# Patient Record
Sex: FEMALE | Race: WHITE | NOT HISPANIC OR LATINO | Employment: FULL TIME | URBAN - METROPOLITAN AREA
[De-identification: names, ages, dates, MRNs, and addresses within clinical notes are randomized per-mention and may not be internally consistent; named-entity substitution may affect disease eponyms.]

---

## 2023-12-27 ENCOUNTER — APPOINTMENT (OUTPATIENT)
Dept: HEMATOLOGY/ONCOLOGY | Facility: HOSPITAL | Age: 38
End: 2023-12-27

## 2024-01-22 ENCOUNTER — PATIENT OUTREACH (OUTPATIENT)
Dept: HEMATOLOGY/ONCOLOGY | Facility: HOSPITAL | Age: 39
End: 2024-01-22
Payer: COMMERCIAL

## 2024-01-22 NOTE — PROGRESS NOTES
RN talked to patient. Patient had DVT in 2010 following a surgery and this was in [place]. Patient states that she is only on anticoagulation with surgery or pregnancy. Patient said that labs were done in August 2023 at [facility]. Patient said that she had ankle surgery in August by Dr. Goodwin who referred her to Dr. Gayle. Patient instructed on date, time and place. Understanding voiced.

## 2024-01-24 ENCOUNTER — OFFICE VISIT (OUTPATIENT)
Dept: HEMATOLOGY/ONCOLOGY | Facility: HOSPITAL | Age: 39
End: 2024-01-24
Payer: COMMERCIAL

## 2024-01-24 VITALS
HEART RATE: 80 BPM | SYSTOLIC BLOOD PRESSURE: 139 MMHG | HEIGHT: 64 IN | RESPIRATION RATE: 18 BRPM | BODY MASS INDEX: 30.45 KG/M2 | OXYGEN SATURATION: 98 % | WEIGHT: 178.35 LBS | TEMPERATURE: 97.7 F | DIASTOLIC BLOOD PRESSURE: 79 MMHG

## 2024-01-24 DIAGNOSIS — Z86.718 HX OF DEEP VENOUS THROMBOSIS: Primary | ICD-10-CM

## 2024-01-24 PROCEDURE — 1036F TOBACCO NON-USER: CPT | Performed by: INTERNAL MEDICINE

## 2024-01-24 PROCEDURE — 99214 OFFICE O/P EST MOD 30 MIN: CPT | Performed by: INTERNAL MEDICINE

## 2024-01-24 ASSESSMENT — PAIN SCALES - GENERAL: PAINLEVEL: 0-NO PAIN

## 2024-01-24 NOTE — PROGRESS NOTES
Patient ID: Valentine Clark is a 38 y.o. female.  Referring Physician: No referring provider defined for this encounter.  Primary Care Provider: No primary care provider on file.  Visit Type:  Initial Visit     Subjective    HPI    Ms Clark is a 37 yo F that comes today for her first visit with us. She was referred by Dr Goodwin given her hx of DVT for perioperative anticoagulation recommendations.     She comes accompanied by her mother, Rafa. She had a LLE DVT in March 2010. This was diagnosed after L swelling 10 days after a L ankle surgery for torn ligament tear. Pt reports there were 2 clots on her calf. At that time she also was on OCP. She was treated with 9 weeks of Lovenox. She has been off OCP since.     In January 2011 she became pregnant and was put on Lovenox 1x/day then later in pregnancy Hep subcutaneous. She delivered baby in June 2011 with no complications and completed 6w post partum prophylactic AC.     Later in 2015 she had a second L ankle surgery for plantar fasciitis and tear. She was post-op managed with Xarelto for 6 weeks with no recurrent clot.   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 She underwent surgery in August with Dr Goodwin. Post-op she received 4mo of prophylactic anticoagulation with Eliqui, which finished in December. She is recovering well. She is back on full time work. She walks a lot at work and is very active with no mobility issues at this time.     She has tolerated AC well in all situations with no bleeding complications.   In regards of hypercoagulable wup, at time of index event she had lab work done in [place], which we are unable to review, but mentions it was unremarkable except POP-1 polymorphism. Most recently pre-op in Aug she had lab work repeat at [facility], with normal Ptn C, PtnS, PT gene, anticardiolipin and lupus anticoagulant, normal activated Ptn C  "resistance ratio (APC-R).  She is not planned for any surgical procedures and is not planning on pregnancy.     PMH: as above,     PSurgHx: as above, vaginal delivery    FamHx: father with Parkinson's disease (dx at 50yo) had a provoked fatal PE after brain surgery (age 60 yo), paternal grandparents with DVTs in their 80s, mother had early stage uterine cancer    Social: Works as  at [employer]. Never smoker, social ETOH, no drug use. Lives in [place], has one child.     Meds: none    Review of Systems - Oncology     12-ROS negative    Objective   BSA: 1.91 meters squared  /79 (BP Location: Left arm, Patient Position: Sitting, BP Cuff Size: Adult)   Pulse 80   Temp 36.5 °C (97.7 °F) (Temporal)   Resp 18   Ht (S) 1.621 m (5' 3.82\")   Wt 80.9 kg (178 lb 5.6 oz)   SpO2 98%   BMI 30.79 kg/m²      has no past medical history on file.   has no past surgical history on file.  No family history on file.  Oncology History    No history exists.       Valentine Clark  reports that she has never smoked. She has never used smokeless tobacco.  She  has no history on file for alcohol use.  She  has no history on file for drug use.      Physical Exam     Gen: awake, alert, in no acute distress  CV: RRR, no m/r/g  Pulm: CTAB, without w/r/r  Abd: soft, NT/ND, no HSM  Ext: mild L ankle edema, no varicose veins, surgical scar well healed  Skin: warm and dry  Neuro: A&Ox4, moves all 4 extremities spontaneously     Labs    Component  Ref Range & Units 5 mo ago   WBC  3.70 - 11.00 k/uL 6.42   RBC  3.90 - 5.20 m/uL 4.73   Hemoglobin  11.5 - 15.5 g/dL 15.4   Hematocrit  36.0 - 46.0 % 44.7   MCV  80.0 - 100.0 fL 94.5   MCH  26.0 - 34.0 pg 32.6   MCHC  30.5 - 36.0 g/dL 34.5   RDW-CV  11.5 - 15.0 % 12.9   Platelet Count  150 - 400 k/uL 330   MPV  9.0 - 12.7 fL 10.3   Neutrophils %  % 69.2   Abs Neut  1.45 - 7.50 k/uL 4.44   Lymphocytes %  % 22.1   Abs Lymph  1.00 - 4.00 k/uL 1.42   Monocytes %  % 4.2   Abs Mono  <0.87 " k/uL 0.27   Eosinophils %  % 3.3   Abs Eosin  <0.46 k/uL 0.21   Basophils %  % 0.9   Abs Baso  <0.11 k/uL 0.06   Immature Granulocytes %  % 0.3   Abs Immature Gran  <0.10 k/uL <0.03   NRBC  /100 WBC 0.0   Absolute nRBC  <0.01 k/uL <0.01     Protein, Total  6.3 - 8.0 g/dL 7.3   Albumin  3.9 - 4.9 g/dL 4.8   Calcium, Total  8.5 - 10.2 mg/dL 9.6   Bilirubin, Total  0.2 - 1.3 mg/dL 0.5   Alkaline Phosphatase  34 - 123 U/L 76   AST  13 - 35 U/L 15   ALT  7 - 38 U/L 14   Glucose  74 - 99 mg/dL 79     Protein S Clottable 119 -- --   Antithrombin Assay 120 -- --   APC Resistance 2.65 -- --   Factor VIII:C Assay 89 -- --   Hexagonal Phase Screen 45.0 -- --   Hexagonal Phase Confirm 43.3 -- --   Hexagonal Phase Delta 1.7 -- --   Thrombin Time <16.8 -- --   APTT Screen 31.3 -- --   PT Sec -- 10.3 --   INR -- 1.0 --   APTT -- 29.4 --   Fibrinogen -- 415 High     --     Component  Ref Range & Units 5 mo ago   Cardiolipin Ab, IgA  <12.0 APL <9.0     Component  Ref Range & Units 5 mo ago   Cardiolipin Ab, IgG  <15.0 GPL <9.0     Component  Ref Range & Units 5 mo ago   Cardiolipin Ab, IgG  <15.0 GPL <9.0     Prothrombin Gene Mutation   Result: NORMAL     Assessment/Plan      Ms Clark is a 37 yo F with hx of DVT referred for perioperative anticoagulation recommendations.     She had one DVT in 2010 in post op period after L ankle surgery. At that time also on OCP. After index event, she appropriately received anticoagulation during pregnancy (<1 year after her VTE) and post operatively after 2 additional L ankle surgeries (2015, 2023). She has done well while on AC and previous wup negative for any inherited or acquired hypercoagulable state.     Currently, she does not have plans for surgeries/pregnancies, but she will need scrupulous evaluation for prophylactic anticoagulation following future surgeries and we are happy to see her for guidance in this matter. For now we don't see an indication for long-term anticoagulation but  if she has a second VTE, she will need indefinite anticoagulation. We encouraged ambulation and keeping an active lifestyle.     RTC as needed    Patient seen and discussed with Dr Gayle.    Maryanne Marr MD  Hematology Oncology fellow, PGY-5    Note:  she has been managed well through all these surgeries with prophylactic anticoagulation.  Her left leg looks well without venous stasis disease.  The left calf is only slightly larger.  I agree that all surgeries need scrupulous anticoagulation protection.  However, if she gets another clot after appropriate full anticoagulation for a certain period of time, I would recommend 2nd prophylaxis into perpituity.    MIRANDA Gayle